# Patient Record
Sex: MALE | Race: WHITE | NOT HISPANIC OR LATINO | Employment: FULL TIME | URBAN - METROPOLITAN AREA
[De-identification: names, ages, dates, MRNs, and addresses within clinical notes are randomized per-mention and may not be internally consistent; named-entity substitution may affect disease eponyms.]

---

## 2023-08-07 ENCOUNTER — HOSPITAL ENCOUNTER (EMERGENCY)
Facility: MEDICAL CENTER | Age: 28
End: 2023-08-07
Attending: STUDENT IN AN ORGANIZED HEALTH CARE EDUCATION/TRAINING PROGRAM
Payer: COMMERCIAL

## 2023-08-07 ENCOUNTER — APPOINTMENT (OUTPATIENT)
Dept: RADIOLOGY | Facility: MEDICAL CENTER | Age: 28
End: 2023-08-07
Attending: STUDENT IN AN ORGANIZED HEALTH CARE EDUCATION/TRAINING PROGRAM
Payer: COMMERCIAL

## 2023-08-07 VITALS
DIASTOLIC BLOOD PRESSURE: 59 MMHG | RESPIRATION RATE: 16 BRPM | SYSTOLIC BLOOD PRESSURE: 119 MMHG | TEMPERATURE: 97.3 F | BODY MASS INDEX: 25.36 KG/M2 | HEIGHT: 75 IN | WEIGHT: 203.93 LBS | HEART RATE: 77 BPM | OXYGEN SATURATION: 94 %

## 2023-08-07 DIAGNOSIS — E86.0 DEHYDRATION: ICD-10-CM

## 2023-08-07 DIAGNOSIS — E87.6 HYPOKALEMIA: ICD-10-CM

## 2023-08-07 LAB
ANION GAP SERPL CALC-SCNC: 16 MMOL/L (ref 7–16)
BASOPHILS # BLD AUTO: 0.3 % (ref 0–1.8)
BASOPHILS # BLD: 0.03 K/UL (ref 0–0.12)
BUN SERPL-MCNC: 15 MG/DL (ref 8–22)
CALCIUM SERPL-MCNC: 10.2 MG/DL (ref 8.5–10.5)
CHLORIDE SERPL-SCNC: 105 MMOL/L (ref 96–112)
CO2 SERPL-SCNC: 19 MMOL/L (ref 20–33)
CREAT SERPL-MCNC: 1 MG/DL (ref 0.5–1.4)
EKG IMPRESSION: NORMAL
EOSINOPHIL # BLD AUTO: 0.05 K/UL (ref 0–0.51)
EOSINOPHIL NFR BLD: 0.6 % (ref 0–6.9)
ERYTHROCYTE [DISTWIDTH] IN BLOOD BY AUTOMATED COUNT: 36.9 FL (ref 35.9–50)
GFR SERPLBLD CREATININE-BSD FMLA CKD-EPI: 106 ML/MIN/1.73 M 2
GLUCOSE SERPL-MCNC: 72 MG/DL (ref 65–99)
HCT VFR BLD AUTO: 49.8 % (ref 42–52)
HGB BLD-MCNC: 17.5 G/DL (ref 14–18)
IMM GRANULOCYTES # BLD AUTO: 0.03 K/UL (ref 0–0.11)
IMM GRANULOCYTES NFR BLD AUTO: 0.3 % (ref 0–0.9)
LYMPHOCYTES # BLD AUTO: 3.11 K/UL (ref 1–4.8)
LYMPHOCYTES NFR BLD: 35 % (ref 22–41)
MAGNESIUM SERPL-MCNC: 2 MG/DL (ref 1.5–2.5)
MCH RBC QN AUTO: 29.9 PG (ref 27–33)
MCHC RBC AUTO-ENTMCNC: 35.1 G/DL (ref 32.3–36.5)
MCV RBC AUTO: 85.1 FL (ref 81.4–97.8)
MONOCYTES # BLD AUTO: 0.69 K/UL (ref 0–0.85)
MONOCYTES NFR BLD AUTO: 7.8 % (ref 0–13.4)
NEUTROPHILS # BLD AUTO: 4.98 K/UL (ref 1.82–7.42)
NEUTROPHILS NFR BLD: 56 % (ref 44–72)
NRBC # BLD AUTO: 0 K/UL
NRBC BLD-RTO: 0 /100 WBC (ref 0–0.2)
PLATELET # BLD AUTO: 221 K/UL (ref 164–446)
PMV BLD AUTO: 10.2 FL (ref 9–12.9)
POTASSIUM SERPL-SCNC: 3.5 MMOL/L (ref 3.6–5.5)
RBC # BLD AUTO: 5.85 M/UL (ref 4.7–6.1)
SODIUM SERPL-SCNC: 140 MMOL/L (ref 135–145)
WBC # BLD AUTO: 8.9 K/UL (ref 4.8–10.8)

## 2023-08-07 PROCEDURE — 93005 ELECTROCARDIOGRAM TRACING: CPT | Performed by: STUDENT IN AN ORGANIZED HEALTH CARE EDUCATION/TRAINING PROGRAM

## 2023-08-07 PROCEDURE — A9270 NON-COVERED ITEM OR SERVICE: HCPCS | Mod: JZ | Performed by: STUDENT IN AN ORGANIZED HEALTH CARE EDUCATION/TRAINING PROGRAM

## 2023-08-07 PROCEDURE — 71046 X-RAY EXAM CHEST 2 VIEWS: CPT

## 2023-08-07 PROCEDURE — 99284 EMERGENCY DEPT VISIT MOD MDM: CPT

## 2023-08-07 PROCEDURE — 80048 BASIC METABOLIC PNL TOTAL CA: CPT

## 2023-08-07 PROCEDURE — 700105 HCHG RX REV CODE 258: Mod: JZ | Performed by: STUDENT IN AN ORGANIZED HEALTH CARE EDUCATION/TRAINING PROGRAM

## 2023-08-07 PROCEDURE — 83735 ASSAY OF MAGNESIUM: CPT

## 2023-08-07 PROCEDURE — 36415 COLL VENOUS BLD VENIPUNCTURE: CPT

## 2023-08-07 PROCEDURE — 85025 COMPLETE CBC W/AUTO DIFF WBC: CPT

## 2023-08-07 PROCEDURE — 700102 HCHG RX REV CODE 250 W/ 637 OVERRIDE(OP): Mod: JZ | Performed by: STUDENT IN AN ORGANIZED HEALTH CARE EDUCATION/TRAINING PROGRAM

## 2023-08-07 RX ORDER — SODIUM CHLORIDE, SODIUM LACTATE, POTASSIUM CHLORIDE, CALCIUM CHLORIDE 600; 310; 30; 20 MG/100ML; MG/100ML; MG/100ML; MG/100ML
1000 INJECTION, SOLUTION INTRAVENOUS ONCE
Status: COMPLETED | OUTPATIENT
Start: 2023-08-07 | End: 2023-08-07

## 2023-08-07 RX ORDER — POTASSIUM CHLORIDE 20 MEQ/1
20 TABLET, EXTENDED RELEASE ORAL ONCE
Status: COMPLETED | OUTPATIENT
Start: 2023-08-07 | End: 2023-08-07

## 2023-08-07 RX ADMIN — POTASSIUM CHLORIDE 20 MEQ: 1500 TABLET, EXTENDED RELEASE ORAL at 18:09

## 2023-08-07 RX ADMIN — SODIUM CHLORIDE, POTASSIUM CHLORIDE, SODIUM LACTATE AND CALCIUM CHLORIDE 1000 ML: 600; 310; 30; 20 INJECTION, SOLUTION INTRAVENOUS at 16:54

## 2023-08-07 NOTE — ED TRIAGE NOTES
"Chief Complaint   Patient presents with    Heat Exposure    Tingling     The pt reports playing outside with daughter this morning. The pt then started feeling tingling all over body during that time. The pt went inside and drank water and ate some salt. However the tinging escalated to cramping. The pt is pale and anxious.       Pt ambulatory to triage. Pt A&Ox4, for the above complaint.     Pt to lobby . Pt educated on alerting staff in changes to condition. Pt verbalized understanding.     BP (!) 148/87   Pulse 100   Temp 36.2 °C (97.1 °F) (Temporal)   Resp 20   Ht 1.905 m (6' 3\")   Wt 92.5 kg (203 lb 14.8 oz)   SpO2 99%   BMI 25.49 kg/m²     "

## 2023-08-07 NOTE — ED PROVIDER NOTES
ED Provider Note    CHIEF COMPLAINT  Chief Complaint   Patient presents with    Heat Exposure    Tingling     The pt reports playing outside with daughter this morning. The pt then started feeling tingling all over body during that time. The pt went inside and drank water and ate some salt. However the tinging escalated to cramping. The pt is pale and anxious.       EXTERNAL RECORDS REVIEWED      HPI/ROS  LIMITATION TO HISTORY   Select: : None  OUTSIDE HISTORIAN(S):      Rajesh Ramesh is a 27 y.o. male who presents heat exposure.  Patient says he was outside with his daughter for 30 minutes and 90 degree weather.  She began to notice tingling in his extremities, fatigue.  Patient says he also feels anxious with mild shortness of breath.  Patient says he had similar symptoms during heat exposure in the past.  Patient denies other recent illness including fever, chills vomiting chest pain, back pain, abdominal pain, vomiting, diarrhea, urinary changes.  Patient denies recent surgeries, leg swelling or pain, history of blood clots.  Patient smokes marijuana but denies other drug or alcohol use.  Patient states he has been urinating more frequently.    PAST MEDICAL HISTORY   has a past medical history of History of heat stroke.    SURGICAL HISTORY  patient denies any surgical history    FAMILY HISTORY  History reviewed. No pertinent family history.    SOCIAL HISTORY  Social History     Tobacco Use    Smoking status: Never    Smokeless tobacco: Never   Vaping Use    Vaping Use: Every day    Substances: Nicotine   Substance and Sexual Activity    Alcohol use: Never    Drug use: Yes     Comment: cannabis    Sexual activity: Not on file       CURRENT MEDICATIONS  Home Medications       Reviewed by Joel Courtney R.N. (Registered Nurse) on 08/07/23 at 1556  Med List Status: Partial     Medication Last Dose Status        Patient Pop Taking any Medications                           ALLERGIES  Allergies   Allergen  "Reactions    Penicillins Unspecified     Unknown symptoms, allergy at birth       PHYSICAL EXAM  VITAL SIGNS: /71   Pulse 63   Temp 36.2 °C (97.1 °F) (Temporal)   Resp 16   Ht 1.905 m (6' 3\")   Wt 92.5 kg (203 lb 14.8 oz)   SpO2 92%   BMI 25.49 kg/m²    Anxious appearing, no acute distress, no respiratory distress, clear bilateral breath sounds, normal heart sounds, abdomen soft nontender, gross motor function sensation intact, no facial asymmetry, normal speech, visual fields intact, normal finger-to-nose and heel-to-shin, no leg swelling or tenderness    DIAGNOSTIC STUDIES / PROCEDURES  EKG  I have independently interpreted this EKG  Results for orders placed or performed during the hospital encounter of 23   EKG (NOW)   Result Value Ref Range    Report       [facility] Emergency Dept.    Test Date:  2023  Pt Name:    DOMINIC ALMANZAR        Department: ER  MRN:        [id number]                      Room:       ORTHO  Gender:     Male                         Technician: [id number]  :        1995                   Requested By:EDWIN ARGUETA  Order #:    [id number]                    Reading MD: Edwin Argueta    Measurements  Intervals                                Axis  Rate:       73                           P:          44  CA:         146                          QRS:        96  QRSD:       103                          T:          20  QT:         375  QTc:        414    Interpretive Statements  Interpreted by me: Sinus rhythm, rate 73, normal intervals no signs of acute  ischemia, WPW, ARVD, HCM, Brugada  Electronically Signed On 2023 17:27:59 PDT by Edwin Argueta           LABS  Labs Reviewed   BASIC METABOLIC PANEL - Abnormal; Notable for the following components:       Result Value    Potassium 3.5 (*)     Co2 19 (*)     All other components within normal limits   CBC WITH DIFFERENTIAL   MAGNESIUM   ESTIMATED GFR "         RADIOLOGY    COURSE & MEDICAL DECISION MAKING    ED Observation Status?     INITIAL ASSESSMENT, COURSE AND PLAN  Care Narrative: Differential includes heat exhaustion, metabolic derangement, endocrine derangement, low suspicion for PE patient has no risk factors, hypoxemia, tachypnea, symptoms associated with heat exposure.  Patient without respecters for ACS.  Obtain blood work given patient's poor p.o. intake frequent urination.  Will initiate IV fluids and reassess.    After ongoing oral rehydration and IV hydration patient reports significant improvement in symptoms.  Blood work normal for mild hypokalemia, mild acidosis.  Suspect blood work abnormalities secondary to poor p.o. intake.  No history or exam to suggest underlying infectious process or shock state.  Discussed with patient high potassium diet, follow-up with PCP and return precautions which he and his mother comfortable with.  HYDRATION: Based on the patient's presentation of Dehydration the patient was given IV fluids. IV Hydration was used because oral hydration was not adequate alone. Upon recheck following hydration, the patient was with improved symptoms.      ADDITIONAL PROBLEM LIST    DISPOSITION AND DISCUSSIONS  I have discussed management of the patient with the following physicians and CIPRIANO's:      Discussion of management with other QHP or appropriate source(s):      Escalation of care considered, and ultimately not performed:    Barriers to care at this time, including but not limited to: Patient does not have established PCP.     Decision tools and prescription drugs considered including, but not limited to: .    FINAL DIAGNOSIS  1. Dehydration           Electronically signed by: Edwin Argueta D.O., 8/7/2023 4:40 PM

## 2023-08-08 NOTE — ED NOTES
Discharge instructions given to patient, prescriptions provided, a verbal understanding of all instructions was stated. IV removed, cathlon intact, site without s/s of infection. Pt preferred to walk out accompanied by mother VSS,  all belongings accounted for.

## 2023-08-08 NOTE — DISCHARGE INSTRUCTIONS
As we discussed you should be sure to stay hydrated and regular time outdoors.  We have given you contact information for a family medicine clinic who you should follow-up with.  If you pass out develop chest pain, trouble breathing he should return to the emergency department.